# Patient Record
Sex: FEMALE | Race: BLACK OR AFRICAN AMERICAN | NOT HISPANIC OR LATINO | URBAN - METROPOLITAN AREA
[De-identification: names, ages, dates, MRNs, and addresses within clinical notes are randomized per-mention and may not be internally consistent; named-entity substitution may affect disease eponyms.]

---

## 2017-12-28 ENCOUNTER — EMERGENCY (EMERGENCY)
Facility: HOSPITAL | Age: 42
LOS: 1 days | Discharge: HOME | End: 2017-12-28
Attending: EMERGENCY MEDICINE
Payer: MEDICAID

## 2017-12-28 VITALS
TEMPERATURE: 98 F | DIASTOLIC BLOOD PRESSURE: 80 MMHG | HEIGHT: 69 IN | SYSTOLIC BLOOD PRESSURE: 155 MMHG | WEIGHT: 160.06 LBS | HEART RATE: 89 BPM | OXYGEN SATURATION: 98 % | RESPIRATION RATE: 18 BRPM

## 2017-12-28 PROCEDURE — 93005 ELECTROCARDIOGRAM TRACING: CPT

## 2017-12-28 PROCEDURE — 99283 EMERGENCY DEPT VISIT LOW MDM: CPT

## 2017-12-28 PROCEDURE — 99284 EMERGENCY DEPT VISIT MOD MDM: CPT

## 2017-12-28 RX ORDER — MOMETASONE FUROATE 50 UG/1
2 SPRAY NASAL
Qty: 1 | Refills: 0 | OUTPATIENT
Start: 2017-12-28 | End: 2018-01-26

## 2017-12-28 RX ORDER — MECLIZINE HCL 12.5 MG
1 TABLET ORAL
Qty: 20 | Refills: 0 | OUTPATIENT
Start: 2017-12-28

## 2017-12-28 NOTE — ED ADULT TRIAGE NOTE - CHIEF COMPLAINT QUOTE
as per pt sinus problem x 2 months not getting better,was given antibx for sinus infection,high blood pressure and dizziness,pressure in the head

## 2017-12-28 NOTE — ED PROVIDER NOTE - PHYSICAL EXAMINATION
HENMT: No facial TTP.  NEUROLOGICAL: Strength and sensation intact. HENMT: No facial TTP.  NEUROLOGICAL: Strength and sensation intact.  intact finger to nose

## 2017-12-28 NOTE — ED PROVIDER NOTE - OBJECTIVE STATEMENT
41 y/o F pt with no PMHx and no PSHx presents to ED c/o sinus pressure (sinus infection) x2 months. Pt reports completing multiple rounds of Abx treatments, in addition to using Flonase, with no relief of sx's. Pt additionally reports intermittent imbalance described as a "swaying sensation". Pt was prompted to visit the ED as pt ran out of Flonase today. Pt denies fever, chills, nasal drainage, CP, SOB, or any other complaints. Pt also denies having been seen by an ENT physician for current sx's. NKDA. 43 y/o F pt with no PMHx and no PSHx presents to ED c/o sinus pressure (sinus infection) x2 months. Pt reports completing multiple rounds of Abx treatments, in addition to using Flonase, with no relief of sx's. Pt additionally reports intermittent imbalance described as a "swaying sensation" worse when bending down and walking. Pt was prompted to visit the ED as pt ran out of Flonase today and is concerned that symptoms are persistent. Pt denies fever, chills, nasal drainage, CP, SOB, or any other complaints. Pt also denies having been seen by an ENT physician for current sx's. NKDA. also notes bilateral ear pressure

## 2017-12-28 NOTE — ED PROVIDER NOTE - CRANIAL NERVE AND PUPILLARY EXAM
cough reflex intact/corneal reflex intact/peripheral vision intact/extra-ocular movements intact/cranial nerves 2-12 intact/central and peripheral vision intact/central vision intact/gag reflex intact/tongue is midline

## 2017-12-28 NOTE — ED PROVIDER NOTE - MEDICAL DECISION MAKING DETAILS
43 y/o F pt presents with chronic sinusitis and sx's suggestive of vertigo. Will d/c home with Meclizine, Flonase, and ENT f/u.

## 2018-01-05 PROBLEM — Z00.00 ENCOUNTER FOR PREVENTIVE HEALTH EXAMINATION: Status: ACTIVE | Noted: 2018-01-05

## 2018-04-16 ENCOUNTER — APPOINTMENT (OUTPATIENT)
Dept: OTOLARYNGOLOGY | Facility: CLINIC | Age: 43
End: 2018-04-16

## 2019-06-19 ENCOUNTER — APPOINTMENT (OUTPATIENT)
Dept: VASCULAR SURGERY | Facility: CLINIC | Age: 44
End: 2019-06-19
Payer: MEDICAID

## 2019-06-19 VITALS
DIASTOLIC BLOOD PRESSURE: 99 MMHG | BODY MASS INDEX: 25.48 KG/M2 | SYSTOLIC BLOOD PRESSURE: 134 MMHG | WEIGHT: 178 LBS | HEIGHT: 70 IN | TEMPERATURE: 97.9 F | HEART RATE: 72 BPM

## 2019-06-19 DIAGNOSIS — Z87.891 PERSONAL HISTORY OF NICOTINE DEPENDENCE: ICD-10-CM

## 2019-06-19 DIAGNOSIS — Z78.9 OTHER SPECIFIED HEALTH STATUS: ICD-10-CM

## 2019-06-19 DIAGNOSIS — I10 ESSENTIAL (PRIMARY) HYPERTENSION: ICD-10-CM

## 2019-06-19 PROCEDURE — 93971 EXTREMITY STUDY: CPT

## 2019-06-19 PROCEDURE — 99202 OFFICE O/P NEW SF 15 MIN: CPT

## 2019-06-19 RX ORDER — LABETALOL HYDROCHLORIDE 300 MG/1
TABLET, FILM COATED ORAL
Refills: 0 | Status: ACTIVE | COMMUNITY

## 2019-06-19 NOTE — ASSESSMENT
[FreeTextEntry1] : 43 yo female with history of htn, gerd presents for evaluation of left lower extremity aching pain. \par venous duplex shows no evidence of dvt or insufficency of the left lower extremity \par pt with palpable pedal pulses and no evidence of claudications \par no vascular surgical interventions at this time \par pt to follow up with her pcp for further evaluation

## 2019-06-19 NOTE — CONSULT LETTER
[Dear  ___] : Dear  [unfilled], [Consult Letter:] : I had the pleasure of evaluating your patient, [unfilled]. [Please see my note below.] : Please see my note below. [Sincerely,] : Sincerely, [FreeTextEntry3] : Kandice Fallon PA-C\[facility]\par

## 2019-06-19 NOTE — HISTORY OF PRESENT ILLNESS
[FreeTextEntry1] : 43 yo female with history of htn, gerd presents for evaluation of left lower extremity aching pain.  pt states that the pain started about 2 years ago and has also noted intermittent bruising of the left lower extremity.  pt states that she is mainly sedentary and states that she has also been experiencing some swelling in the left foot.  pt describes the pain as an aching, numb type of pain.  pt states that walking does make the pain worse but denies any claudication like symptoms, she is able to walk for several blocks without any pain that stops her from walking.  pt denies any pain in the evening that wakes her from sleep.  pt denies any history of cp/sob\par pt states that she has been wearing compression stockings with some relief.  \par pain is rated about 4/10.  \par pt also reports feeling a "knot" in the posterior calf was seen in the ed and was told no evidence of blood clot\par pt denies any recent travel, hospitalizations or surgeries \par pt denies any personal history of dvt.

## 2019-06-19 NOTE — REVIEW OF SYSTEMS
[As Noted in HPI] : as noted in HPI [Negative] : Integumentary [Chest Pain] : no chest pain [Shortness Of Breath] : no shortness of breath

## 2019-06-19 NOTE — PHYSICAL EXAM
[Ankle Swelling (On Exam)] : present [2+] : left 2+ [Ankle Swelling On The Right] : mild [Ankle Swelling On The Left] : of the left ankle [No Rash or Lesion] : No rash or lesion [Alert] : alert [Calm] : calm [JVD] : no jugular venous distention  [Varicose Veins Of Lower Extremities] : not present [] : not present [Skin Ulcer] : no ulcer [de-identified] : mild left calf tenderness \par motor intact b/l lower extremities, muscle strength 5/5 with dorsi and plantar flexion\par  [de-identified] : appears well

## 2020-11-10 NOTE — ED PROVIDER NOTE - CPE EDP EYES NORM
normal... Constant observation Constant observation Constant observation Constant observation Constant observation Constant observation Constant observation Constant observation Constant observation

## 2021-08-19 ENCOUNTER — RESULT REVIEW (OUTPATIENT)
Age: 46
End: 2021-08-19